# Patient Record
Sex: MALE | Race: BLACK OR AFRICAN AMERICAN | NOT HISPANIC OR LATINO | Employment: UNEMPLOYED | URBAN - METROPOLITAN AREA
[De-identification: names, ages, dates, MRNs, and addresses within clinical notes are randomized per-mention and may not be internally consistent; named-entity substitution may affect disease eponyms.]

---

## 2017-06-08 ENCOUNTER — HOSPITAL ENCOUNTER (EMERGENCY)
Facility: HOSPITAL | Age: 27
Discharge: HOME | End: 2017-06-08
Attending: EMERGENCY MEDICINE
Payer: MEDICAID

## 2017-06-08 ENCOUNTER — HOSPITAL ENCOUNTER (EMERGENCY)
Facility: OTHER | Age: 27
Discharge: ANOTHER HOSPITAL | End: 2017-06-08
Attending: EMERGENCY MEDICINE
Payer: MEDICAID

## 2017-06-08 VITALS
WEIGHT: 153 LBS | BODY MASS INDEX: 24.59 KG/M2 | TEMPERATURE: 100 F | OXYGEN SATURATION: 100 % | HEART RATE: 82 BPM | SYSTOLIC BLOOD PRESSURE: 131 MMHG | DIASTOLIC BLOOD PRESSURE: 92 MMHG | RESPIRATION RATE: 14 BRPM | HEIGHT: 66 IN

## 2017-06-08 VITALS
SYSTOLIC BLOOD PRESSURE: 134 MMHG | TEMPERATURE: 98 F | WEIGHT: 153 LBS | OXYGEN SATURATION: 99 % | DIASTOLIC BLOOD PRESSURE: 94 MMHG | HEART RATE: 80 BPM | RESPIRATION RATE: 18 BRPM | BODY MASS INDEX: 24.01 KG/M2 | HEIGHT: 67 IN

## 2017-06-08 DIAGNOSIS — R73.9 HYPERGLYCEMIA: Primary | ICD-10-CM

## 2017-06-08 DIAGNOSIS — R53.1 WEAKNESS: ICD-10-CM

## 2017-06-08 DIAGNOSIS — E10.8 TYPE 1 DIABETES MELLITUS WITH COMPLICATION: Primary | ICD-10-CM

## 2017-06-08 DIAGNOSIS — D64.9 ANEMIA, UNSPECIFIED TYPE: ICD-10-CM

## 2017-06-08 LAB
ALBUMIN SERPL BCP-MCNC: 1.8 G/DL
ALBUMIN SERPL-MCNC: 2 G/DL (ref 3.3–5.5)
ALBUMIN SERPL-MCNC: 2.1 G/DL (ref 3.3–5.5)
ALLENS TEST: ABNORMAL
ALP SERPL-CCNC: 112 U/L
ALP SERPL-CCNC: 112 U/L (ref 42–141)
ALP SERPL-CCNC: 114 U/L (ref 42–141)
ALT SERPL W/O P-5'-P-CCNC: 38 U/L
ANION GAP SERPL CALC-SCNC: 6 MMOL/L
AST SERPL-CCNC: 36 U/L
B-OH-BUTYR BLD STRIP-SCNC: 0.1 MMOL/L
BACTERIA #/AREA URNS HPF: ABNORMAL /HPF
BASOPHILS # BLD AUTO: 0.02 K/UL
BASOPHILS NFR BLD: 0.3 %
BILIRUB SERPL-MCNC: 0.1 MG/DL
BILIRUB SERPL-MCNC: 0.3 MG/DL (ref 0.2–1.6)
BILIRUB SERPL-MCNC: 0.3 MG/DL (ref 0.2–1.6)
BILIRUB UR QL STRIP: NEGATIVE
BILIRUBIN, POC UA: NEGATIVE
BLOOD, POC UA: ABNORMAL
BUN SERPL-MCNC: 10 MG/DL
BUN SERPL-MCNC: 14 MG/DL (ref 7–22)
CALCIUM SERPL-MCNC: 8.5 MG/DL
CALCIUM SERPL-MCNC: 8.6 MG/DL (ref 8–10.3)
CHLORIDE SERPL-SCNC: 101 MMOL/L
CHLORIDE SERPL-SCNC: 89 MMOL/L (ref 98–108)
CLARITY UR: CLEAR
CLARITY, POC UA: CLEAR
CO2 SERPL-SCNC: 28 MMOL/L
COLOR UR: ABNORMAL
COLOR, POC UA: ABNORMAL
CREAT SERPL-MCNC: 0.5 MG/DL (ref 0.6–1.2)
CREAT SERPL-MCNC: 0.8 MG/DL
DELSYS: ABNORMAL
DIFFERENTIAL METHOD: ABNORMAL
EOSINOPHIL # BLD AUTO: 0.1 K/UL
EOSINOPHIL NFR BLD: 1.8 %
ERYTHROCYTE [DISTWIDTH] IN BLOOD BY AUTOMATED COUNT: 11.6 %
EST. GFR  (AFRICAN AMERICAN): >60 ML/MIN/1.73 M^2
EST. GFR  (NON AFRICAN AMERICAN): >60 ML/MIN/1.73 M^2
GLUCOSE SERPL-MCNC: 502 MG/DL
GLUCOSE SERPL-MCNC: >700 MG/DL (ref 73–118)
GLUCOSE UR QL STRIP: ABNORMAL
GLUCOSE, POC UA: 500 MG/DL
HCO3 UR-SCNC: 28.1 MMOL/L (ref 24–28)
HCO3 UR-SCNC: 29.8 MMOL/L (ref 24–28)
HCT VFR BLD AUTO: 24.5 %
HGB BLD-MCNC: 8.4 G/DL
HGB UR QL STRIP: ABNORMAL
HYALINE CASTS #/AREA URNS LPF: 0 /LPF
KETONES UR QL STRIP: NEGATIVE
KETONES, POC UA: NEGATIVE
LDH SERPL L TO P-CCNC: 1.38 MMOL/L (ref 0.5–2.2)
LEUKOCYTE EST, POC UA: NEGATIVE
LEUKOCYTE ESTERASE UR QL STRIP: ABNORMAL
LYMPHOCYTES # BLD AUTO: 2.5 K/UL
LYMPHOCYTES NFR BLD: 31.4 %
MCH RBC QN AUTO: 29.3 PG
MCHC RBC AUTO-ENTMCNC: 34.3 %
MCV RBC AUTO: 85 FL
MICROSCOPIC COMMENT: ABNORMAL
MODE: ABNORMAL
MONOCYTES # BLD AUTO: 0.8 K/UL
MONOCYTES NFR BLD: 9.9 %
NEUTROPHILS # BLD AUTO: 4.4 K/UL
NEUTROPHILS NFR BLD: 56.6 %
NITRITE UR QL STRIP: NEGATIVE
NITRITE, POC UA: NEGATIVE
PCO2 BLDA: 40.4 MMHG (ref 35–45)
PCO2 BLDA: 42 MMHG (ref 35–45)
PH SMN: 7.43 [PH] (ref 7.35–7.45)
PH SMN: 7.48 [PH] (ref 7.35–7.45)
PH UR STRIP: 5 [PH] (ref 5–8)
PH UR STRIP: 5.5 [PH]
PLATELET # BLD AUTO: 458 K/UL
PMV BLD AUTO: 9.3 FL
PO2 BLDA: 203 MMHG (ref 40–60)
PO2 BLDA: 69 MMHG (ref 40–60)
POC ALT (SGPT): 37 U/L (ref 10–47)
POC ALT (SGPT): 40 U/L (ref 10–47)
POC AMYLASE: 54 U/L (ref 14–97)
POC AST (SGOT): 43 U/L (ref 11–38)
POC AST (SGOT): 44 U/L (ref 11–38)
POC B-TYPE NATRIURETIC PEPTIDE: 37.3 PG/ML (ref 0–100)
POC BE: 3 MMOL/L
POC BE: 6 MMOL/L
POC CARDIAC TROPONIN I: 0 NG/ML
POC GGT: 25 U/L (ref 5–65)
POC PTINR: 1 (ref 0.9–1.2)
POC PTWBT: 12 SEC (ref 9.7–14.3)
POC SATURATED O2: 100 % (ref 95–100)
POC SATURATED O2: 94 % (ref 95–100)
POC TCO2: 28 MMOL/L (ref 18–33)
POC TCO2: 29 MMOL/L (ref 24–29)
POC TCO2: 31 MMOL/L (ref 24–29)
POCT GLUCOSE: 312 MG/DL (ref 70–110)
POCT GLUCOSE: 343 MG/DL (ref 70–110)
POCT GLUCOSE: 402 MG/DL (ref 70–110)
POTASSIUM BLD-SCNC: 4.5 MMOL/L (ref 3.6–5.1)
POTASSIUM SERPL-SCNC: 3.6 MMOL/L
PROT SERPL-MCNC: 5.7 G/DL
PROT UR QL STRIP: ABNORMAL
PROTEIN, POC UA: 30 MG/DL
PROTEIN, POC: 6.1 G/DL (ref 6.4–8.1)
PROTEIN, POC: 6.2 G/DL (ref 6.4–8.1)
RBC # BLD AUTO: 2.87 M/UL
RBC #/AREA URNS HPF: 6 /HPF (ref 0–4)
SAMPLE: ABNORMAL
SAMPLE: ABNORMAL
SAMPLE: NORMAL
SAMPLE: NORMAL
SITE: ABNORMAL
SODIUM BLD-SCNC: 129 MMOL/L (ref 128–145)
SODIUM SERPL-SCNC: 135 MMOL/L
SP GR UR STRIP: 1.02 (ref 1–1.03)
SPECIFIC GRAVITY, POC UA: <=1.005
SQUAMOUS #/AREA URNS HPF: 2 /HPF
TROPONIN I SERPL DL<=0.01 NG/ML-MCNC: 0.01 NG/ML
URN SPEC COLLECT METH UR: ABNORMAL
UROBILINOGEN UR STRIP-ACNC: NEGATIVE EU/DL
UROBILINOGEN, POC UA: 0.2 E.U./DL
WBC # BLD AUTO: 7.86 K/UL
WBC #/AREA URNS HPF: 4 /HPF (ref 0–5)
YEAST URNS QL MICRO: ABNORMAL

## 2017-06-08 PROCEDURE — 63600175 PHARM REV CODE 636 W HCPCS: Performed by: EMERGENCY MEDICINE

## 2017-06-08 PROCEDURE — 25000003 PHARM REV CODE 250: Performed by: EMERGENCY MEDICINE

## 2017-06-08 PROCEDURE — 82962 GLUCOSE BLOOD TEST: CPT

## 2017-06-08 PROCEDURE — 84484 ASSAY OF TROPONIN QUANT: CPT

## 2017-06-08 PROCEDURE — 85025 COMPLETE CBC W/AUTO DIFF WBC: CPT

## 2017-06-08 PROCEDURE — 81000 URINALYSIS NONAUTO W/SCOPE: CPT

## 2017-06-08 PROCEDURE — 96374 THER/PROPH/DIAG INJ IV PUSH: CPT

## 2017-06-08 PROCEDURE — 99291 CRITICAL CARE FIRST HOUR: CPT | Mod: 25

## 2017-06-08 PROCEDURE — 96372 THER/PROPH/DIAG INJ SC/IM: CPT

## 2017-06-08 PROCEDURE — 99900035 HC TECH TIME PER 15 MIN (STAT)

## 2017-06-08 PROCEDURE — 99285 EMERGENCY DEPT VISIT HI MDM: CPT | Mod: 25

## 2017-06-08 PROCEDURE — 80053 COMPREHEN METABOLIC PANEL: CPT

## 2017-06-08 PROCEDURE — 93005 ELECTROCARDIOGRAM TRACING: CPT

## 2017-06-08 PROCEDURE — 80053 COMPREHEN METABOLIC PANEL: CPT | Mod: 91

## 2017-06-08 PROCEDURE — 96361 HYDRATE IV INFUSION ADD-ON: CPT

## 2017-06-08 PROCEDURE — 82010 KETONE BODYS QUAN: CPT | Mod: 91

## 2017-06-08 PROCEDURE — 96375 TX/PRO/DX INJ NEW DRUG ADDON: CPT

## 2017-06-08 PROCEDURE — 93010 ELECTROCARDIOGRAM REPORT: CPT | Mod: ,,, | Performed by: INTERNAL MEDICINE

## 2017-06-08 PROCEDURE — 82010 KETONE BODYS QUAN: CPT

## 2017-06-08 RX ORDER — INSULIN GLARGINE 100 [IU]/ML
35 INJECTION, SOLUTION SUBCUTANEOUS NIGHTLY
Qty: 10 ML | Refills: 2 | Status: SHIPPED | OUTPATIENT
Start: 2017-06-08

## 2017-06-08 RX ORDER — ASPIRIN 325 MG
325 TABLET ORAL
Status: COMPLETED | OUTPATIENT
Start: 2017-06-08 | End: 2017-06-08

## 2017-06-08 RX ORDER — INSULIN ASPART 100 [IU]/ML
15 INJECTION, SOLUTION INTRAVENOUS; SUBCUTANEOUS 2 TIMES DAILY WITH MEALS
Qty: 10 ML | Refills: 1 | Status: SHIPPED | OUTPATIENT
Start: 2017-06-08

## 2017-06-08 RX ORDER — HYDRALAZINE HYDROCHLORIDE 20 MG/ML
10 INJECTION INTRAMUSCULAR; INTRAVENOUS
Status: COMPLETED | OUTPATIENT
Start: 2017-06-08 | End: 2017-06-08

## 2017-06-08 RX ORDER — LANCETS
1 EACH MISCELLANEOUS
Qty: 100 EACH | Refills: 0 | Status: SHIPPED | OUTPATIENT
Start: 2017-06-08

## 2017-06-08 RX ADMIN — INSULIN HUMAN 10 UNITS: 100 INJECTION, SOLUTION PARENTERAL at 04:06

## 2017-06-08 RX ADMIN — SODIUM CHLORIDE 1000 ML: 0.9 INJECTION, SOLUTION INTRAVENOUS at 08:06

## 2017-06-08 RX ADMIN — INSULIN DETEMIR 12 UNITS: 100 INJECTION, SOLUTION SUBCUTANEOUS at 10:06

## 2017-06-08 RX ADMIN — HYDRALAZINE HYDROCHLORIDE 10 MG: 20 INJECTION INTRAMUSCULAR; INTRAVENOUS at 06:06

## 2017-06-08 RX ADMIN — SODIUM CHLORIDE 1000 ML: 0.9 INJECTION, SOLUTION INTRAVENOUS at 05:06

## 2017-06-08 RX ADMIN — SODIUM CHLORIDE 1000 ML: 0.9 INJECTION, SOLUTION INTRAVENOUS at 04:06

## 2017-06-08 RX ADMIN — SODIUM CHLORIDE 1000 ML: 0.9 INJECTION, SOLUTION INTRAVENOUS at 02:06

## 2017-06-08 RX ADMIN — INSULIN HUMAN 7 UNITS: 100 INJECTION, SOLUTION PARENTERAL at 08:06

## 2017-06-08 RX ADMIN — ASPIRIN 325 MG ORAL TABLET 325 MG: 325 PILL ORAL at 02:06

## 2017-06-08 NOTE — ED PROVIDER NOTES
Encounter Date: 6/8/2017       History     Chief Complaint   Patient presents with    Abdominal Pain     onset one month     Leg Pain     Review of patient's allergies indicates:  No Known Allergies  Kranthi Gurrola is a 26 y.o. male who presents to the Emergency Department with  weakness, dizziness, feels like he is going to pass out.   Patient states today he was trying to help a friend mow the yard and started feeling like he was going to pass out.  Patient states that is was jusr too hot for him today.  Patient also reports his legs and swelling for the last 3 days. He reports having pain in his legs secondary to swelling.  Patients grandmother Mrs. Madrid is at bedside      The history is provided by the patient.   General Illness    The current episode started today. The problem has been rapidly worsening. Nothing relieves the symptoms. The symptoms are aggravated by movement. Pertinent negatives include no fever, no abdominal pain, no nausea, no headaches, no sore throat, no shortness of breath, no wheezing and no rash.     Past Medical History:   Diagnosis Date    Diabetes mellitus      History reviewed. No pertinent surgical history.  History reviewed. No pertinent family history.  Social History   Substance Use Topics    Smoking status: Former Smoker     Packs/day: 0.25     Years: 3.00    Smokeless tobacco: Current User      Comment: quit three months ago    Alcohol use No     Review of Systems   Constitutional: Positive for fatigue. Negative for fever.   HENT: Negative for sore throat.    Respiratory: Negative for shortness of breath and wheezing.    Cardiovascular: Positive for leg swelling. Negative for chest pain and palpitations.   Gastrointestinal: Negative for abdominal pain and nausea.   Genitourinary: Negative for dysuria.   Musculoskeletal: Negative for back pain.   Skin: Negative for rash.   Neurological: Positive for dizziness. Negative for seizures, weakness, numbness and headaches.    Hematological: Does not bruise/bleed easily.   All other systems reviewed and are negative.      Physical Exam     Initial Vitals   BP Pulse Resp Temp SpO2   -- -- -- -- --   Initial vital signs at 1420 temperature 99.7 pulse 90 respiratory rate 18 blood pressure 142/94 O2 sats 97%.  Patient is diaphoretic on arrival I was present and evaluating patient on arrival.  Physical Exam    Constitutional: Vital signs are normal. He appears well-developed. He is diaphoretic. He does not appear ill. He appears distressed.   HENT:   Head: Normocephalic and atraumatic.   Right Ear: Hearing and external ear normal.   Left Ear: Hearing and external ear normal.   Nose: Nose normal.   Mouth/Throat: Uvula is midline. Mucous membranes are dry. No oropharyngeal exudate, posterior oropharyngeal edema or posterior oropharyngeal erythema.   Eyes: Conjunctivae and EOM are normal. Pupils are equal, round, and reactive to light.   Neck: Trachea normal and normal range of motion. Neck supple. No stridor present.   Cardiovascular: Normal rate, regular rhythm, S1 normal, S2 normal, normal heart sounds, intact distal pulses and normal pulses. Exam reveals no gallop and no friction rub.    No murmur heard.  Pulmonary/Chest: Breath sounds normal. No respiratory distress. He has no wheezes. He has no rhonchi. He has no rales. He exhibits no tenderness.   Abdominal: Soft. Bowel sounds are normal. He exhibits distension. He exhibits no mass. There is no tenderness. There is no rigidity, no rebound and no guarding.   Musculoskeletal: Normal range of motion. He exhibits edema and tenderness.   Lymphadenopathy:     He has no cervical adenopathy.   Neurological: He is alert and oriented to person, place, and time.   Skin: Skin is warm and intact. No abrasion and no rash noted. There is pallor.   Psychiatric: His speech is delayed. He is withdrawn.         ED Course   Critical Care  Date/Time: 6/8/2017 4:44 PM  Performed by: JENNY NICHOLS  by: JENNY NICHOLS   Direct patient critical care time: 15 minutes  Additional history critical care time: 10 minutes  Ordering / reviewing critical care time: 12 minutes  Documentation critical care time: 10 minutes  Consult with family critical care time: 10 minutes  Total critical care time (exclusive of procedural time) : 57 minutes  Critical care was time spent personally by me on the following activities: evaluation of patient's response to treatment, obtaining history from patient or surrogate, pulse oximetry, review of old charts, ordering and review of laboratory studies, re-evaluation of patient's condition, ordering and review of radiographic studies, ordering and performing treatments and interventions and examination of patient.  Comments: Patient presents presented diaphoretic with weakness.  Patient with dry mucous members on arrival.  Patient with initial Accu-Chek greater than 500.  CMP glucose greater than 700.  IV fluids given subcutaneous and IV insulin ordered.  Patient reports feeling a little better after initial treatment in ED.  We'll transfer patient to [facility] for further evaluation and admission        Labs Reviewed   POCT LIVER PANEL - Abnormal; Notable for the following:        Result Value    Albumin, POC 2.1 (*)     AST (SGOT), POC 44 (*)     Protein 6.2 (*)     All other components within normal limits   ISTAT PROCEDURE - Abnormal; Notable for the following:     POC PH 7.476 (*)     POC PO2 203 (*)     POC HCO3 29.8 (*)     POC TCO2 31 (*)     All other components within normal limits   TROPONIN ISTAT   BETA - HYDROXYBUTYRATE, SERUM   COMPREHENSIVE METABOLIC PANEL   POCT CBC   POCT URINALYSIS W/O SCOPE   POCT GLUCOSE   POCT GLUCOSE   POCT GLUCOSE   POCT GLUCOSE   POCT GLUCOSE   POCT CMP   POCT PROTIME-INR   POCT B-TYPE NATRIURETIC PEPTIDE (BNP)   POCT TROPONIN   POCT AMYLASE   ISTAT PROCEDURE   POCT B-TYPE NATRIURETIC PEPTIDE (BNP)        VBG pH 7.47, lactic 1.38.  CMP sodium  129, potassium 4.5, glucose greater than 700, chloride 89 bicarbonate 28, be UN 14 creatinine 0.5.  BNP 37  Troponin 0.00  CBC White blood cell count 6.8, hemoglobin 9.2, hematocrit 28.5 and platelets 413  INR of 1  amylase normal at 54.    EKG Readings: (Independently Interpreted)   Initial Reading: No STEMI. Rhythm: Normal Sinus Rhythm. Heart Rate: 87. Axis: Normal. Clinical Impression: Normal Sinus Rhythm Other Impression: Normal EKG no ST elevation.  QTC is normal at 450     Venous ultrasound report  Impression: No sonographic evidence for DVT   signed by Dr. Adilson Boggs     Imaging Results          CT Head Without Contrast (Final result)  Result time 06/08/17 17:08:58    Final result by German Moe III, MD (06/08/17 17:08:58)                 Narrative:    Study Desc:   CT HEAD WITHOUT CONTRAST  History: Weakness and dizziness     Comparison exam: None.     Technique: CT of the head is performed on a multidetector CT with axial imaging.  Coronal   and sagittal reconstructions were obtained.     The total DLP for the procedure is 738.58 mGy.      The brain parenchyma is normal with normal gray and white interfaces, sulci and gyri.    There are no intracranial masses, mass effect or midline shift.  There is no cerebral   edema.  There is no subarachnoid hemorrhage.  There are no intra-or extra-axial fluid   collections, intraventricular or intraparenchymal hemorrhage.  There are no low   attenuation demarcating areas, to suggest subacute stroke on CT.  The pineal, sellar,   skull base and brainstem regions appear unremarkable.     The lateral, third and fourth ventricles appear unremarkable.  The suprasellar and   basilar cisterns appear unremarkable.  Mild cerebral cortical atrophy.     The visualized orbits, paranasal sinuses and mastoid regions appear unremarkable.     There are no definite skull osseous abnormality seen.     If there is further clinical concern for intracranial pathology, MRI of the  brain may be   performed for further assessment.     Impression:  1.  Unremarkable CT of the head without contrast.  No intracranial hemorrhage, masses or   stroke.  Mild cortical atrophy.     SL: 23 Signed by: ZAHRAA Moe MD  2017-06-08 17:08:57                             US Lower Extremity Veins Bilateral (In process)                X-Ray Chest AP Portable (Final result)  Result time 06/08/17 15:16:27    Final result by Taye Lozano MD (06/08/17 15:16:27)                 Narrative:       XR CHEST AP PORTABLE  Clinical history: Chest pain and dizziness.     Comparison: None.     Findings:  Frontal chest radiograph was obtained.  The lungs are slightly under inflated and clear.    There is no focal airspace consolidation, pleural effusion or pneumothorax.  Mild   pulmonary vascular crowding is noted.  The trachea is midline and patent.  Cardiac   silhouette is within normal limits.  The bony structures are unremarkable.     Impression:  Underinflated clear lungs.     SL: 24     Signed by: Taye Lozano M.D.  2017-06-08 15:16:15                                                      ED Course       Contacted [facility] and  spoke with Jennifer at 4:49 pm. Consultation with Dr Butts for ED to ED.  Discussed patient's presentation, past medical history, physical exam, and ED course.  Also discussed vital signs and diagnosis is.  At this time patient will be transferred & admitted.  Patient is agreeable to transfer & admission.  At 1800 patient is awake alert speaking clearly in full sentences.  Moving all extremities.  Patient's grandmother remains at bedside awaiting transfer to [facility].  Clinical Impression:   The primary encounter diagnosis was Hyperglycemia. A diagnosis of Weakness was also pertinent to this visit.          Brigette Lozano DO  06/09/17 3898

## 2017-06-08 NOTE — ED NOTES
Patient placed on continuous cardiac monitor, automatic blood pressure cuff and continuous pulse oximeter. Applied oxygen at 2 liters per nasal cannula

## 2017-06-09 LAB
ALBUMIN SERPL BCP-MCNC: 1.8 G/DL
ALP SERPL-CCNC: 125 U/L
ALT SERPL W/O P-5'-P-CCNC: 38 U/L
ANION GAP SERPL CALC-SCNC: 7 MMOL/L
AST SERPL-CCNC: 41 U/L
B-OH-BUTYR BLD STRIP-SCNC: 0.4 MMOL/L
BILIRUB SERPL-MCNC: 0.1 MG/DL
BUN SERPL-MCNC: 13 MG/DL
CALCIUM SERPL-MCNC: 8.5 MG/DL
CHLORIDE SERPL-SCNC: 94 MMOL/L
CO2 SERPL-SCNC: 25 MMOL/L
CREAT SERPL-MCNC: 1 MG/DL
EST. GFR  (AFRICAN AMERICAN): >60 ML/MIN/1.73 M^2
EST. GFR  (NON AFRICAN AMERICAN): >60 ML/MIN/1.73 M^2
GLUCOSE SERPL-MCNC: 788 MG/DL
POCT GLUCOSE: >500 MG/DL (ref 70–110)
POTASSIUM SERPL-SCNC: 6.4 MMOL/L
PROT SERPL-MCNC: 6.1 G/DL
SODIUM SERPL-SCNC: 126 MMOL/L

## 2017-06-09 NOTE — ED TRIAGE NOTES
TRANSFER FROM [facility] FOR HYPERGLYCEMIA WORKUP. A/A/O, NO S/S OF ACUTE DISTRESS NOTED.

## 2017-06-09 NOTE — ED PROVIDER NOTES
Encounter Date: 6/8/2017    SCRIBE #1 NOTE: I, Halle Lopez, am scribing for, and in the presence of,  Nancy Rooney MD. I have scribed the following portions of the note - Other sections scribed: HPI, ROS, PE.       History     Chief Complaint   Patient presents with    Hyperglycemia     Pt from [place] ED for rule out DKA.  Pt reports leg weakness and diaphoresis.  EMS reports CBG >500 and Glucose in blood >700.  Pt received 10 units insulin SQ, 10 units regular insulin IV and 10 mg HCTZ PTA.      Review of patient's allergies indicates:  No Known Allergies  CC: Hyperglycemia    HPI: 26 year old male with medical hx of DM type I and personal hx of HTN, insomnia, and depression presents to the ED via EMS for an evaluation of generalized weakness, bilateral leg pain and swelling, polyuria, and polydipsia. Pt reports he noticed the leg pain and swelling for the past 3 days. Pt was evaluated at the [place] ED clinic and was administered 10 units of insulin SQ, 10 units regular insulin IV, and 10 mg HCTZ PTA. He was sent to this ED for further evaluation to rule out DKA. Pt states he forgot to take his insulin injections for the past 2 days. He takes novolog 15 units after every meal 3 x a day and lantus 12 units every night. Symptoms are acute onset. Pt denies fever, chills, abdominal pain, N/V/D, and headaches. He reports no further symptoms. No alleviating factors.     Pt does not have a PCP that he follows up with. He reports he sees a provider in [place] for mental health issues. He does not know the name of the provider and clinic. He states he is on medications for HTN, insomnia, and depression but does not know the names of the medications.      The history is provided by the patient. No  was used.     Past Medical History:   Diagnosis Date    Diabetes mellitus      No past surgical history on file.  No family history on file.  Social History   Substance Use Topics    Smoking status:  Former Smoker     Packs/day: 0.25     Years: 3.00    Smokeless tobacco: Current User      Comment: quit three months ago    Alcohol use No     Review of Systems   Constitutional: Positive for fatigue. Negative for chills, diaphoresis and fever.   HENT: Negative for ear pain and sore throat.    Eyes: Negative for photophobia and visual disturbance.   Respiratory: Negative for cough and shortness of breath.    Cardiovascular: Positive for leg swelling. Negative for chest pain.   Gastrointestinal: Negative for abdominal pain, diarrhea, nausea and vomiting.   Endocrine: Positive for polydipsia and polyuria.   Genitourinary: Negative for dysuria.   Musculoskeletal: Negative for back pain.        (+) bilateral leg pain   Skin: Negative for rash.   Neurological: Negative for headaches.       Physical Exam     Initial Vitals [06/08/17 1946]   BP Pulse Resp Temp SpO2   (!) 139/95 82 18 97.8 °F (36.6 °C) 99 %     Physical Exam    Nursing note and vitals reviewed.  Constitutional: He appears well-developed and well-nourished. No distress.   HENT:   Head: Normocephalic and atraumatic.   Mouth/Throat: Mucous membranes are normal.   Patient has moist mucous membranes.   Eyes: Conjunctivae and EOM are normal. Pupils are equal, round, and reactive to light.   Neck: Normal range of motion. Neck supple.   Cardiovascular: Normal rate and normal heart sounds.   Pulmonary/Chest: Effort normal and breath sounds normal.   Abdominal: Soft. Normal appearance and bowel sounds are normal. There is no tenderness.   Musculoskeletal: Normal range of motion.   Neurological: He is alert and oriented to person, place, and time. He has normal strength. No cranial nerve deficit or sensory deficit.   Skin: Skin is warm and dry.   Psychiatric: His behavior is normal. Thought content normal.   Patient has flat affect.         ED Course   Procedures  Labs Reviewed   CBC W/ AUTO DIFFERENTIAL - Abnormal; Notable for the following:        Result Value     RBC 2.87 (*)     Hemoglobin 8.4 (*)     Hematocrit 24.5 (*)     Platelets 458 (*)     All other components within normal limits   COMPREHENSIVE METABOLIC PANEL - Abnormal; Notable for the following:     Sodium 135 (*)     Glucose 502 (*)     Calcium 8.5 (*)     Total Protein 5.7 (*)     Albumin 1.8 (*)     Anion Gap 6 (*)     All other components within normal limits    Narrative:     GLUCOSE critical result(s) called and verbal readback obtained from   MELVIN GAN. , 06/08/2017 20:46   ISTAT PROCEDURE - Abnormal; Notable for the following:     POC PO2 69 (*)     POC HCO3 28.1 (*)     POC SATURATED O2 94 (*)     All other components within normal limits   POCT GLUCOSE - Abnormal; Notable for the following:     POCT Glucose 402 (*)     All other components within normal limits   POCT GLUCOSE - Abnormal; Notable for the following:     POCT Glucose 343 (*)     All other components within normal limits   POCT GLUCOSE - Abnormal; Notable for the following:     POCT Glucose 312 (*)     All other components within normal limits   BETA - HYDROXYBUTYRATE, SERUM   TROPONIN I   URINALYSIS   URINALYSIS MICROSCOPIC   POCT GLUCOSE MONITORING CONTINUOUS   POCT GLUCOSE MONITORING CONTINUOUS     EKG Readings: (Independently Interpreted)   Previous EKG: Compared with most recent EKG   Normal sinus rhythm, heart rate 77, normal axis, normal intervals no STEMI          Medical Decision Making:   Initial Assessment:   Urgent evaluation of 27-year-old gentleman with type 1 diabetes sent from [facility] given concern for hyperglycemia, possible DKA.  Patient received 10 units insulin prior to arrival.  Had originally presented given generalized weakness, currently has no complaints.  Patient does endorse not using insulin for the last 2 days and no longer has a glucometer and not following with a PCP. Will continue to hydrate here, awaiting labs to eval ketones, AG and reassess.   Clinical Tests:   Lab Tests: Ordered and  Reviewed  Medical Tests: Ordered and Reviewed  ED Management:  Labs without evidence of DKA.   No ketones in urine, nml ph, nml bicarb, nml AG, neg betahydroxybuterate  Pt hydrated extensively in the ED and given long acting insulin.   Patient does report history of anemia, no prior transfusions, no longer taking iron.  Refills for insulin and glucometer/testing supplies given and pt thoroughly educated regarding the serious nature of his chronic illness and need for prompt follow-up with his primary care physician, endocrinology, and medication adherence.            Scribe Attestation:   Scribe #1: I performed the above scribed service and the documentation accurately describes the services I performed. I attest to the accuracy of the note.    Attending Attestation:           Physician Attestation for Scribe:  Physician Attestation Statement for Scribe #1: I, Nancy Rooney MD, reviewed documentation, as scribed by Halle Lopez in my presence, and it is both accurate and complete.                 ED Course     Clinical Impression:   The primary encounter diagnosis was Type 1 diabetes mellitus with complication. Diagnoses of Weakness and Anemia, unspecified type were also pertinent to this visit.    Disposition:   Disposition: Discharged  Condition: Fair       Nancy Rooney MD  06/08/17 1303